# Patient Record
Sex: MALE | Race: WHITE | Employment: OTHER | URBAN - METROPOLITAN AREA
[De-identification: names, ages, dates, MRNs, and addresses within clinical notes are randomized per-mention and may not be internally consistent; named-entity substitution may affect disease eponyms.]

---

## 2017-09-25 PROBLEM — E78.2 MIXED HYPERLIPIDEMIA: Status: ACTIVE | Noted: 2017-09-25

## 2017-09-25 PROBLEM — I25.10 CORONARY ARTERY DISEASE INVOLVING NATIVE CORONARY ARTERY OF NATIVE HEART WITHOUT ANGINA PECTORIS: Status: ACTIVE | Noted: 2017-09-25

## 2018-05-04 ENCOUNTER — HOSPITAL ENCOUNTER (EMERGENCY)
Age: 63
Discharge: HOME | End: 2018-05-04
Attending: EMERGENCY MEDICINE
Payer: COMMERCIAL

## 2018-05-04 VITALS
TEMPERATURE: 97.9 F | RESPIRATION RATE: 16 BRPM | DIASTOLIC BLOOD PRESSURE: 78 MMHG | HEART RATE: 60 BPM | OXYGEN SATURATION: 95 % | WEIGHT: 205 LBS | HEIGHT: 73 IN | SYSTOLIC BLOOD PRESSURE: 118 MMHG | BODY MASS INDEX: 27.17 KG/M2

## 2018-05-04 DIAGNOSIS — L60.0 INGROWN RIGHT BIG TOENAIL: Primary | ICD-10-CM

## 2018-05-04 PROCEDURE — 99282 EMERGENCY DEPT VISIT SF MDM: CPT

## 2018-05-04 PROCEDURE — 6370000000 HC RX 637 (ALT 250 FOR IP): Performed by: EMERGENCY MEDICINE

## 2018-05-04 RX ORDER — CEPHALEXIN 500 MG/1
500 CAPSULE ORAL ONCE
Status: COMPLETED | OUTPATIENT
Start: 2018-05-04 | End: 2018-05-04

## 2018-05-04 RX ORDER — CEPHALEXIN 500 MG/1
500 CAPSULE ORAL 4 TIMES DAILY
Qty: 40 CAPSULE | Refills: 0 | Status: SHIPPED | OUTPATIENT
Start: 2018-05-04 | End: 2018-05-14

## 2018-05-04 RX ADMIN — CEPHALEXIN 500 MG: 500 CAPSULE ORAL at 10:42

## 2018-05-04 ASSESSMENT — PAIN DESCRIPTION - ORIENTATION: ORIENTATION: RIGHT

## 2018-05-04 ASSESSMENT — PAIN SCALES - GENERAL: PAINLEVEL_OUTOF10: 5

## 2018-09-04 ENCOUNTER — TELEPHONE (OUTPATIENT)
Dept: ADMINISTRATIVE | Age: 63
End: 2018-09-04

## 2018-09-04 RX ORDER — METOPROLOL SUCCINATE 25 MG/1
25 TABLET, EXTENDED RELEASE ORAL 2 TIMES DAILY
Qty: 60 TABLET | Refills: 5 | Status: SHIPPED | OUTPATIENT
Start: 2018-09-04 | End: 2019-03-27 | Stop reason: SDUPTHER

## 2018-09-04 NOTE — TELEPHONE ENCOUNTER
Spoke with patient. He stated [facility] has no refills on his bottle and he cannot transfer to [facility] like that. He retired and he has to use a local pharmacy now.

## 2018-10-08 ENCOUNTER — OFFICE VISIT (OUTPATIENT)
Dept: CARDIOLOGY CLINIC | Age: 63
End: 2018-10-08
Payer: COMMERCIAL

## 2018-10-08 VITALS
DIASTOLIC BLOOD PRESSURE: 78 MMHG | WEIGHT: 198 LBS | RESPIRATION RATE: 14 BRPM | HEART RATE: 51 BPM | BODY MASS INDEX: 26.24 KG/M2 | SYSTOLIC BLOOD PRESSURE: 122 MMHG | HEIGHT: 73 IN

## 2018-10-08 DIAGNOSIS — I25.10 CORONARY ARTERY DISEASE INVOLVING NATIVE CORONARY ARTERY OF NATIVE HEART WITHOUT ANGINA PECTORIS: Primary | ICD-10-CM

## 2018-10-08 PROCEDURE — 99213 OFFICE O/P EST LOW 20 MIN: CPT | Performed by: INTERNAL MEDICINE

## 2018-10-08 PROCEDURE — 93000 ELECTROCARDIOGRAM COMPLETE: CPT | Performed by: INTERNAL MEDICINE

## 2018-10-08 NOTE — PROGRESS NOTES
Chief Complaint   Patient presents with    Coronary Artery Disease       Patient Active Problem List    Diagnosis Date Noted    Coronary artery disease involving native coronary artery of native heart without angina pectoris 09/25/2017     Overview Note:     A. STEMI 11/2016: Alpine MARILYN 3.5 X 23 to RCA . No other disease. EF 60%      Mixed hyperlipidemia 09/25/2017       Current Outpatient Prescriptions   Medication Sig Dispense Refill    metoprolol succinate (TOPROL XL) 25 MG extended release tablet Take 1 tablet by mouth 2 times daily 60 tablet 5    atorvastatin (LIPITOR) 40 MG tablet Take 1 tablet by mouth daily (Patient taking differently: Take 20 mg by mouth daily ) 90 tablet 3    aspirin 81 MG chewable tablet Take 1 tablet by mouth daily 30 tablet 3     No current facility-administered medications for this visit. No Known Allergies    Vitals:    10/08/18 0732   BP: 122/78   Pulse: 51   Resp: 14   Weight: 198 lb (89.8 kg)   Height: 6' 1\" (1.854 m)       Social History     Social History    Marital status:      Spouse name: N/A    Number of children: N/A    Years of education: N/A     Occupational History    Not on file. Social History Main Topics    Smoking status: Current Every Day Smoker     Types: Cigarettes    Smokeless tobacco: Never Used    Alcohol use No    Drug use: No    Sexual activity: No     Other Topics Concern    Not on file     Social History Narrative    No narrative on file       History reviewed. No pertinent family history. SUBJECTIVE: Edin Rg presents to the office today for re-evaluation of cardiac diagnoses. He complains of no cardia cissues and denies   chest pain, chest pressure/discomfort, claudication, dyspnea, exertional chest pressure/discomfort, fatigue, irregular heart beat, lower extremity edema, near-syncope, orthopnea, palpitations, paroxysmal nocturnal dyspnea, syncope and tachypnea.   Works out on treadmill, does all home

## 2019-03-27 RX ORDER — METOPROLOL SUCCINATE 25 MG/1
TABLET, EXTENDED RELEASE ORAL
Qty: 60 TABLET | Refills: 5 | Status: SHIPPED | OUTPATIENT
Start: 2019-03-27 | End: 2019-09-14 | Stop reason: SDUPTHER

## 2019-09-16 RX ORDER — METOPROLOL SUCCINATE 25 MG/1
TABLET, EXTENDED RELEASE ORAL
Qty: 60 TABLET | Refills: 5 | Status: SHIPPED | OUTPATIENT
Start: 2019-09-16 | End: 2020-01-30

## 2019-10-08 ENCOUNTER — OFFICE VISIT (OUTPATIENT)
Dept: CARDIOLOGY CLINIC | Age: 64
End: 2019-10-08
Payer: COMMERCIAL

## 2019-10-08 VITALS
RESPIRATION RATE: 16 BRPM | WEIGHT: 195 LBS | DIASTOLIC BLOOD PRESSURE: 72 MMHG | HEART RATE: 53 BPM | BODY MASS INDEX: 25.03 KG/M2 | HEIGHT: 74 IN | SYSTOLIC BLOOD PRESSURE: 124 MMHG

## 2019-10-08 DIAGNOSIS — I25.10 CORONARY ARTERY DISEASE INVOLVING NATIVE CORONARY ARTERY OF NATIVE HEART WITHOUT ANGINA PECTORIS: Primary | ICD-10-CM

## 2019-10-08 PROCEDURE — 99214 OFFICE O/P EST MOD 30 MIN: CPT | Performed by: INTERNAL MEDICINE

## 2019-10-08 PROCEDURE — 93000 ELECTROCARDIOGRAM COMPLETE: CPT | Performed by: INTERNAL MEDICINE

## 2019-10-08 RX ORDER — ATORVASTATIN CALCIUM 40 MG/1
40 TABLET, FILM COATED ORAL DAILY
Qty: 90 TABLET | Refills: 5 | Status: SHIPPED
Start: 2019-10-08 | End: 2020-12-21

## 2020-01-30 RX ORDER — METOPROLOL SUCCINATE 25 MG/1
TABLET, EXTENDED RELEASE ORAL
Qty: 180 TABLET | Refills: 3 | Status: SHIPPED
Start: 2020-01-30 | End: 2021-03-01

## 2020-12-15 ENCOUNTER — OFFICE VISIT (OUTPATIENT)
Dept: CARDIOLOGY CLINIC | Age: 65
End: 2020-12-15
Payer: MEDICARE

## 2020-12-15 VITALS
HEART RATE: 57 BPM | HEIGHT: 73 IN | SYSTOLIC BLOOD PRESSURE: 126 MMHG | BODY MASS INDEX: 26.77 KG/M2 | RESPIRATION RATE: 16 BRPM | WEIGHT: 202 LBS | DIASTOLIC BLOOD PRESSURE: 78 MMHG

## 2020-12-15 PROCEDURE — 99213 OFFICE O/P EST LOW 20 MIN: CPT | Performed by: INTERNAL MEDICINE

## 2020-12-15 PROCEDURE — 1036F TOBACCO NON-USER: CPT | Performed by: INTERNAL MEDICINE

## 2020-12-15 PROCEDURE — 1123F ACP DISCUSS/DSCN MKR DOCD: CPT | Performed by: INTERNAL MEDICINE

## 2020-12-15 PROCEDURE — G8419 CALC BMI OUT NRM PARAM NOF/U: HCPCS | Performed by: INTERNAL MEDICINE

## 2020-12-15 PROCEDURE — 4040F PNEUMOC VAC/ADMIN/RCVD: CPT | Performed by: INTERNAL MEDICINE

## 2020-12-15 PROCEDURE — 3017F COLORECTAL CA SCREEN DOC REV: CPT | Performed by: INTERNAL MEDICINE

## 2020-12-15 PROCEDURE — 93000 ELECTROCARDIOGRAM COMPLETE: CPT | Performed by: INTERNAL MEDICINE

## 2020-12-15 PROCEDURE — G8427 DOCREV CUR MEDS BY ELIG CLIN: HCPCS | Performed by: INTERNAL MEDICINE

## 2020-12-15 PROCEDURE — G8484 FLU IMMUNIZE NO ADMIN: HCPCS | Performed by: INTERNAL MEDICINE

## 2020-12-15 NOTE — PROGRESS NOTES
Chief Complaint   Patient presents with    Coronary Artery Disease       Patient Active Problem List    Diagnosis Date Noted    Coronary artery disease involving native coronary artery of native heart without angina pectoris 09/25/2017     Overview Note:     A. STEMI 11/2016: Alpine MARILYN 3.5 X 23 to RCA . No other disease. EF 60%      Mixed hyperlipidemia 09/25/2017       Current Outpatient Medications   Medication Sig Dispense Refill    metoprolol succinate (TOPROL XL) 25 MG extended release tablet TAKE 1 TABLET BY MOUTH TWICE A  tablet 3    atorvastatin (LIPITOR) 40 MG tablet Take 1 tablet by mouth daily 90 tablet 5    aspirin 81 MG chewable tablet Take 1 tablet by mouth daily 30 tablet 3     No current facility-administered medications for this visit.          No Known Allergies    Vitals:    12/15/20 0740   BP: 126/78   Pulse: 57   Resp: 16   Weight: 202 lb (91.6 kg)   Height: 6' 1\" (1.854 m)       Social History     Socioeconomic History    Marital status:      Spouse name: Not on file    Number of children: Not on file    Years of education: Not on file    Highest education level: Not on file   Occupational History    Not on file   Social Needs    Financial resource strain: Not on file    Food insecurity     Worry: Not on file     Inability: Not on file    Transportation needs     Medical: Not on file     Non-medical: Not on file   Tobacco Use    Smoking status: Never Smoker    Smokeless tobacco: Never Used   Substance and Sexual Activity    Alcohol use: No    Drug use: No    Sexual activity: Never   Lifestyle    Physical activity     Days per week: Not on file     Minutes per session: Not on file    Stress: Not on file   Relationships    Social connections     Talks on phone: Not on file     Gets together: Not on file     Attends Quaker service: Not on file     Active member of club or organization: Not on file     Attends meetings of clubs or organizations: Not on file Relationship status: Not on file    Intimate partner violence     Fear of current or ex partner: Not on file     Emotionally abused: Not on file     Physically abused: Not on file     Forced sexual activity: Not on file   Other Topics Concern    Not on file   Social History Narrative    Not on file       History reviewed. No pertinent family history. SUBJECTIVE: Kalani Mccloud presents to the office today for re-evaluation of cardiac diagnoses. He complains of no cardiac issues and denies   chest pain, chest pressure/discomfort, claudication, dyspnea, exertional chest pressure/discomfort, fatigue, irregular heart beat, lower extremity edema, near-syncope, orthopnea, palpitations, paroxysmal nocturnal dyspnea, syncope and tachypnea. Works out on treadmill, does all home chores. Doing MORE than last year,as he also walks a new dog. Compliant with meds. Now has home in [place]. Has not seen PCP for some time         Review of Systems:   Heart: as above   Lungs: as above   Eyes: denies changes in vision or discharge. Ears: denies changes in hearing or pain. Nose: denies epistaxis or masses   Throat: denies sore throat or trouble swallowing. Neuro: denies numbness, tingling, tremors. Skin: denies rashes or itching. : denies hematuria, dysuria   GI: denies vomiting, diarrhea   Psych: denies mood changed, anxiety, depression. all others negative. Physical Exam   /78   Pulse 57   Resp 16   Ht 6' 1\" (1.854 m)   Wt 202 lb (91.6 kg)   BMI 26.65 kg/m²   Constitutional: Oriented to person, place, and time. Well-developed and well-nourished. No distress. Head: Normocephalic and atraumatic. Eyes: EOM are normal. Pupils are equal, round, and reactive to light. Neck: Normal range of motion. Neck supple. No hepatojugular reflux and no JVD present. Carotid bruit is not present. No tracheal deviation present. No thyromegaly present. Cardiovascular: Normal rate, regular rhythm, normal heart sounds and intact distal pulses. Exam reveals no gallop and no friction rub. No murmur heard. Pulmonary/Chest: Effort normal and breath sounds normal. No respiratory distress. No wheezes. No rales. No tenderness. Abdominal: Soft. Bowel sounds are normal. No distension and no mass. No tenderness. No rebound and no guarding. Musculoskeletal: Normal range of motion. No edema and no tenderness. Lymphadenopathy:   No cervical adenopathy. No groin adenopathy. Neurological: Alert and oriented to person, place, and time. Skin: Skin is warm and dry. No rash noted. Not diaphoretic. No erythema. Psychiatric: Normal mood and affect. Behavior is normal.     EKG:  normal EKG, normal sinus rhythm. ASSESSMENT AND PLAN:  Patient Active Problem List   Diagnosis    Coronary artery disease : PCI 11/2016. No recurrent angina. Continue asa forever    Mixed hyperlipidemia: target LDL < 70. On high intensity statin          The patient was educated as to the symptoms that would require a prompt return to our office. Return visit in 1 year.     Zarina Ruth M.D  [facility]

## 2020-12-21 RX ORDER — ATORVASTATIN CALCIUM 40 MG/1
TABLET, FILM COATED ORAL
Qty: 90 TABLET | Refills: 3 | Status: SHIPPED | OUTPATIENT
Start: 2020-12-21

## 2021-03-01 RX ORDER — METOPROLOL SUCCINATE 25 MG/1
TABLET, EXTENDED RELEASE ORAL
Qty: 180 TABLET | Refills: 3 | Status: SHIPPED
Start: 2021-03-01 | End: 2022-03-14

## 2021-09-13 ENCOUNTER — HOSPITAL ENCOUNTER (EMERGENCY)
Age: 66
Discharge: HOME | End: 2021-09-13
Payer: MEDICARE

## 2021-09-13 VITALS
WEIGHT: 200 LBS | RESPIRATION RATE: 20 BRPM | OXYGEN SATURATION: 97 % | SYSTOLIC BLOOD PRESSURE: 149 MMHG | HEIGHT: 74 IN | HEART RATE: 73 BPM | TEMPERATURE: 98.7 F | DIASTOLIC BLOOD PRESSURE: 88 MMHG | BODY MASS INDEX: 25.67 KG/M2

## 2021-09-13 DIAGNOSIS — J01.11 ACUTE RECURRENT FRONTAL SINUSITIS: Primary | ICD-10-CM

## 2021-09-13 PROCEDURE — 99283 EMERGENCY DEPT VISIT LOW MDM: CPT

## 2021-09-13 RX ORDER — AMOXICILLIN AND CLAVULANATE POTASSIUM 875; 125 MG/1; MG/1
1 TABLET, FILM COATED ORAL 2 TIMES DAILY
Qty: 20 TABLET | Refills: 0 | Status: SHIPPED | OUTPATIENT
Start: 2021-09-13 | End: 2021-09-23

## 2021-09-13 NOTE — ED PROVIDER NOTES
Independent:    HPI:  9/13/21, Time: 12:34 PM EDT         Gabby La is a 77 y.o. male presenting to the ED for evaluation of worsening nasal congestion with mild cough and facial pressure onset this week. The patient reports he has issues with her mental allergies at this time of year and states that he does normally use saline flushes. He has been trying this as well as some Advil Cold and Sinus without relief. He reports that he did try to take some leftover amoxicillin for a few days without relief. He states that the pressure is increasing to his frontal and maxillary sinus area and he can no longer concentrate on some work that he had to do at home. He states that he has had no fever but the congestion in his nose is getting to the point to where he is so uncomfortable. He reports that he has had no chest pain or shortness of breath. He does not like to use other prescription nasal sprays as he knows that there can be some issues with addictive potential.  He has been able to sleep but has been uncomfortable because he has to breathe out of his mouth because he cannot breathe through his nose. He denies any loss of taste. He has had no diarrhea or severe body aches or chills. He did have his COVID vaccine in June, he denies any recent travel and does wear his mask when out. He also uses a respirator when he cuts grass, etc..  The complaint has been persistent, moderate in severity. Review of Systems:   A complete review of systems was performed and pertinent positives and negatives are stated within HPI, all other systems reviewed and are negative.        --------------------------------------------- PAST HISTORY ---------------------------------------------  Past Medical History:  has no past medical history on file. Past Surgical History:  has a past surgical history that includes Coronary angioplasty with stent (11/05/2016) and Appendectomy.     Social History:  reports that he has never smoked. He has never used smokeless tobacco. He reports that he does not drink alcohol and does not use drugs. Family History: family history is not on file. The patients home medications have been reviewed. Allergies: Patient has no known allergies. -------------------------------------------------- RESULTS -------------------------------------------------  All laboratory and radiology results have been personally reviewed by myself   LABS:  No results found for this visit on 09/13/21. RADIOLOGY:  Interpreted by Radiologist.  No orders to display       ------------------------- NURSING NOTES AND VITALS REVIEWED ---------------------------   The nursing notes within the ED encounter and vital signs as below have been reviewed. BP (!) 149/88   Pulse 73   Temp 98.7 °F (37.1 °C) (Oral)   Resp 20   Ht 6' 2\" (1.88 m)   Wt 200 lb (90.7 kg)   SpO2 97%   BMI 25.68 kg/m²   Oxygen Saturation Interpretation: Normal      ---------------------------------------------------PHYSICAL EXAM--------------------------------------      Constitutional/General: Alert and oriented x3, stable appearing, non toxic in NAD  Head: Normocephalic and atraumatic  Eyes: PERRL, EOMI  Ears: TM's dull, some scant cerumen noted bilaterally  Nose: Turbinates swollen with some thick boggy mucosa, some frontal and maxillary sinus tenderness bilaterally  Mouth: Oropharynx with some mild erythema, no uvular edema, handling secretions, no trismus  Neck: Supple, full ROM, some tender anterior cervical nodes, no rigidity and no stridor. Pulmonary: Lungs clear to auscultation bilaterally, no wheezes, rales, or rhonchi. Not in respiratory distress  Cardiovascular:  Regular rate and rhythm, no ectopy. 2+ distal pulses  Abdomen: Soft, non tender, BS active. Extremities: Moves all extremities x 4. No pitting edema. Warm and well perfused  Skin: warm and dry without rash  Neurologic: GCS 15, CN 2 through 12 grossly intact.   Psych: Normal Affect      ------------------------------ ED COURSE/MEDICAL DECISION MAKING----------------------  Medications - No data to display      ED COURSE:       Medical Decision Making:    Patient to ER, complaints of nasal congestion and facial pressure onset worsening over the last few days, not relieved with OTC medications    Patient advised of exam findings and have to still have concerns even with history of having COVID vaccine. Patient declined COVID testing today. Will place patient on Augmentin BID for 10 days, but patient advised if symptoms not improving and/or worsening, he will need to consider COVID testing as this strongly needs to be considered. Discussed need to consider use of Allegra and/or Zyrtec. Continue saline flushes and social distancing advised as well as good hand washing and use of mask. Counseling: The emergency provider has spoken with the patient and discussed todays results, in addition to providing specific details for the plan of care and counseling regarding the diagnosis and prognosis. Questions are answered at this time and they are agreeable with the plan.      --------------------------------- IMPRESSION AND DISPOSITION ---------------------------------    IMPRESSION  1. Acute recurrent frontal sinusitis        DISPOSITION  Disposition: Discharge to home  Patient condition is stable      NOTE: This report was transcribed using voice recognition software.  Every effort was made to ensure accuracy; however, inadvertent computerized transcription errors may be present       Marisol Otero PA-C  09/13/21 [id number]

## 2022-03-14 RX ORDER — METOPROLOL SUCCINATE 25 MG/1
TABLET, EXTENDED RELEASE ORAL
Qty: 60 TABLET | Refills: 0 | Status: SHIPPED
Start: 2022-03-14 | End: 2022-03-28 | Stop reason: SDUPTHER

## 2022-03-28 RX ORDER — METOPROLOL SUCCINATE 25 MG/1
TABLET, EXTENDED RELEASE ORAL
Qty: 60 TABLET | Refills: 0 | Status: SHIPPED
Start: 2022-03-28 | End: 2022-07-05

## 2022-06-20 ENCOUNTER — OFFICE VISIT (OUTPATIENT)
Dept: CARDIOLOGY CLINIC | Age: 67
End: 2022-06-20
Payer: MEDICARE

## 2022-06-20 VITALS
BODY MASS INDEX: 25.71 KG/M2 | SYSTOLIC BLOOD PRESSURE: 147 MMHG | RESPIRATION RATE: 16 BRPM | DIASTOLIC BLOOD PRESSURE: 87 MMHG | HEIGHT: 73 IN | HEART RATE: 53 BPM | WEIGHT: 194 LBS

## 2022-06-20 DIAGNOSIS — I25.10 CORONARY ARTERY DISEASE INVOLVING NATIVE CORONARY ARTERY OF NATIVE HEART WITHOUT ANGINA PECTORIS: Primary | ICD-10-CM

## 2022-06-20 PROCEDURE — G8417 CALC BMI ABV UP PARAM F/U: HCPCS | Performed by: INTERNAL MEDICINE

## 2022-06-20 PROCEDURE — 99213 OFFICE O/P EST LOW 20 MIN: CPT | Performed by: INTERNAL MEDICINE

## 2022-06-20 PROCEDURE — 3017F COLORECTAL CA SCREEN DOC REV: CPT | Performed by: INTERNAL MEDICINE

## 2022-06-20 PROCEDURE — 1123F ACP DISCUSS/DSCN MKR DOCD: CPT | Performed by: INTERNAL MEDICINE

## 2022-06-20 PROCEDURE — 93000 ELECTROCARDIOGRAM COMPLETE: CPT | Performed by: INTERNAL MEDICINE

## 2022-06-20 PROCEDURE — 1036F TOBACCO NON-USER: CPT | Performed by: INTERNAL MEDICINE

## 2022-06-20 PROCEDURE — G8427 DOCREV CUR MEDS BY ELIG CLIN: HCPCS | Performed by: INTERNAL MEDICINE

## 2022-06-20 RX ORDER — ASPIRIN 81 MG/1
81 TABLET ORAL DAILY
COMMUNITY

## 2022-06-20 NOTE — PROGRESS NOTES
Chief Complaint   Patient presents with    Coronary Artery Disease       Patient Active Problem List    Diagnosis Date Noted    Coronary artery disease involving native coronary artery of native heart without angina pectoris 09/25/2017     Overview Note:     A. STEMI 11/2016: Alpine MARILYN 3.5 X 23 to RCA . No other disease. EF 60%      Mixed hyperlipidemia 09/25/2017       Current Outpatient Medications   Medication Sig Dispense Refill    aspirin 81 MG EC tablet Take 81 mg by mouth daily      metoprolol succinate (TOPROL XL) 25 MG extended release tablet TAKE 1 TABLET BY MOUTH TWICE A DAY 60 tablet 0    atorvastatin (LIPITOR) 40 MG tablet TAKE 1 TABLET BY MOUTH EVERY DAY 90 tablet 3     No current facility-administered medications for this visit. No Known Allergies    Vitals:    06/20/22 0959   BP: (!) 147/87   Pulse: 53   Resp: 16   Weight: 194 lb (88 kg)   Height: 6' 1\" (1.854 m)       Social History     Socioeconomic History    Marital status:      Spouse name: Not on file    Number of children: Not on file    Years of education: Not on file    Highest education level: Not on file   Occupational History    Not on file   Tobacco Use    Smoking status: Never Smoker    Smokeless tobacco: Never Used   Vaping Use    Vaping Use: Never used   Substance and Sexual Activity    Alcohol use: No    Drug use: No    Sexual activity: Never   Other Topics Concern    Not on file   Social History Narrative    Not on file     Social Determinants of Health     Financial Resource Strain:     Difficulty of Paying Living Expenses: Not on file   Food Insecurity:     Worried About Running Out of Food in the Last Year: Not on file    Adolfo of Food in the Last Year: Not on file   Transportation Needs:     Lack of Transportation (Medical): Not on file    Lack of Transportation (Non-Medical):  Not on file   Physical Activity:     Days of Exercise per Week: Not on file    Minutes of Exercise per Session: Not on file   Stress:     Feeling of Stress : Not on file   Social Connections:     Frequency of Communication with Friends and Family: Not on file    Frequency of Social Gatherings with Friends and Family: Not on file    Attends Bahai Services: Not on file    Active Member of Clubs or Organizations: Not on file    Attends Club or Organization Meetings: Not on file    Marital Status: Not on file   Intimate Partner Violence:     Fear of Current or Ex-Partner: Not on file    Emotionally Abused: Not on file    Physically Abused: Not on file    Sexually Abused: Not on file   Housing Stability:     Unable to Pay for Housing in the Last Year: Not on file    Number of [place] in the Last Year: Not on file    Unstable Housing in the Last Year: Not on file       History reviewed. No pertinent family history. SUBJECTIVE: Savi Gonzales presents to the office today for re-evaluation of cardiac diagnoses. He complains of no cardiac issues and denies   chest pain, chest pressure/discomfort, claudication, dyspnea, exertional chest pressure/discomfort, fatigue, irregular heart beat, lower extremity edema, near-syncope, orthopnea, palpitations, paroxysmal nocturnal dyspnea, syncope and tachypnea. Works out on treadmill, does all home chores. Doing MORE than last year,as he also walks a new dog. Compliant with meds. Now has home in [place], where he does a great deal of walking. Has lost 8 lbs. Review of Systems:   Heart: as above   Lungs: as above   Eyes: denies changes in vision or discharge. Ears: denies changes in hearing or pain. Nose: denies epistaxis or masses   Throat: denies sore throat or trouble swallowing. Neuro: denies numbness, tingling, tremors. Skin: denies rashes or itching. : denies hematuria, dysuria   GI: denies vomiting, diarrhea   Psych: denies mood changed, anxiety, depression. all others negative.     Physical Exam   BP (!) 147/87   Pulse 53   Resp 16 Ht 6' 1\" (1.854 m)   Wt 194 lb (88 kg)   BMI 25.60 kg/m²   Constitutional: Oriented to person, place, and time. Well-developed and well-nourished. No distress. Head: Normocephalic and atraumatic. Eyes: EOM are normal. Pupils are equal, round, and reactive to light. Neck: Normal range of motion. Neck supple. No hepatojugular reflux and no JVD present. Carotid bruit is not present. No tracheal deviation present. No thyromegaly present. Cardiovascular: Normal rate, regular rhythm, normal heart sounds and intact distal pulses. Exam reveals no gallop and no friction rub. No murmur heard. Pulmonary/Chest: Effort normal and breath sounds normal. No respiratory distress. No wheezes. No rales. No tenderness. Abdominal: Soft. Bowel sounds are normal. No distension and no mass. No tenderness. No rebound and no guarding. Musculoskeletal: Normal range of motion. No edema and no tenderness. Lymphadenopathy:   No cervical adenopathy. No groin adenopathy. Neurological: Alert and oriented to person, place, and time. Skin: Skin is warm and dry. No rash noted. Not diaphoretic. No erythema. Psychiatric: Normal mood and affect. Behavior is normal.     EKG:  normal EKG, normal sinus rhythm. ASSESSMENT AND PLAN:  Patient Active Problem List   Diagnosis    Coronary artery disease : PCI 11/2016. No recurrent angina. Continue asa forever    Mixed hyperlipidemia: target LDL < 70. On high intensity statin          The patient was educated as to the symptoms that would require a prompt return to our office. Return visit in 1 year.     Shala Orona M.D  [facility]

## 2022-07-05 RX ORDER — METOPROLOL SUCCINATE 25 MG/1
TABLET, EXTENDED RELEASE ORAL
Qty: 60 TABLET | Refills: 0 | Status: SHIPPED
Start: 2022-07-05 | End: 2022-08-01

## 2022-08-01 RX ORDER — METOPROLOL SUCCINATE 25 MG/1
TABLET, EXTENDED RELEASE ORAL
Qty: 60 TABLET | Refills: 5 | Status: SHIPPED | OUTPATIENT
Start: 2022-08-01

## 2023-02-01 RX ORDER — METOPROLOL SUCCINATE 25 MG/1
TABLET, EXTENDED RELEASE ORAL
Qty: 180 TABLET | Refills: 1 | Status: SHIPPED | OUTPATIENT
Start: 2023-02-01

## 2023-05-22 ENCOUNTER — HOSPITAL ENCOUNTER (EMERGENCY)
Age: 68
Discharge: HOME | End: 2023-05-22
Payer: MEDICARE

## 2023-05-22 VITALS
OXYGEN SATURATION: 97 % | HEART RATE: 58 BPM | WEIGHT: 194 LBS | BODY MASS INDEX: 25.6 KG/M2 | RESPIRATION RATE: 16 BRPM | TEMPERATURE: 97.6 F | DIASTOLIC BLOOD PRESSURE: 73 MMHG | SYSTOLIC BLOOD PRESSURE: 136 MMHG

## 2023-05-22 DIAGNOSIS — J06.9 UPPER RESPIRATORY TRACT INFECTION, UNSPECIFIED TYPE: Primary | ICD-10-CM

## 2023-05-22 DIAGNOSIS — J01.00 ACUTE MAXILLARY SINUSITIS, RECURRENCE NOT SPECIFIED: ICD-10-CM

## 2023-05-22 DIAGNOSIS — H10.31 ACUTE CONJUNCTIVITIS OF RIGHT EYE, UNSPECIFIED ACUTE CONJUNCTIVITIS TYPE: ICD-10-CM

## 2023-05-22 PROCEDURE — 99283 EMERGENCY DEPT VISIT LOW MDM: CPT

## 2023-05-22 RX ORDER — AMOXICILLIN AND CLAVULANATE POTASSIUM 875; 125 MG/1; MG/1
1 TABLET, FILM COATED ORAL 2 TIMES DAILY
Qty: 20 TABLET | Refills: 0 | Status: SHIPPED | OUTPATIENT
Start: 2023-05-22 | End: 2023-06-01

## 2023-05-22 RX ORDER — TOBRAMYCIN 3 MG/ML
1 SOLUTION/ DROPS OPHTHALMIC EVERY 4 HOURS
Qty: 5 ML | Refills: 0 | Status: SHIPPED | OUTPATIENT
Start: 2023-05-22 | End: 2023-06-01

## 2023-05-22 ASSESSMENT — PAIN SCALES - GENERAL: PAINLEVEL_OUTOF10: 6

## 2023-05-22 ASSESSMENT — PAIN DESCRIPTION - LOCATION: LOCATION: FACE;EYE

## 2023-05-22 ASSESSMENT — PAIN - FUNCTIONAL ASSESSMENT: PAIN_FUNCTIONAL_ASSESSMENT: 0-10

## 2023-05-22 ASSESSMENT — PAIN DESCRIPTION - ONSET: ONSET: ON-GOING

## 2023-05-22 ASSESSMENT — PAIN DESCRIPTION - FREQUENCY: FREQUENCY: CONTINUOUS

## 2023-05-22 ASSESSMENT — PAIN DESCRIPTION - PAIN TYPE: TYPE: ACUTE PAIN

## 2023-05-22 ASSESSMENT — PAIN DESCRIPTION - DESCRIPTORS: DESCRIPTORS: DISCOMFORT;SORE

## 2023-05-22 ASSESSMENT — PAIN DESCRIPTION - ORIENTATION: ORIENTATION: RIGHT

## 2023-05-22 ASSESSMENT — LIFESTYLE VARIABLES
HOW OFTEN DO YOU HAVE A DRINK CONTAINING ALCOHOL: NEVER
HOW MANY STANDARD DRINKS CONTAINING ALCOHOL DO YOU HAVE ON A TYPICAL DAY: PATIENT DOES NOT DRINK

## 2023-05-22 NOTE — ED PROVIDER NOTES
Independent ZHANNA Visit. [facility]  ED  Encounter Note  Admit Date/RoomTime: 2023  2:02 PM  ED Room: [facility]  NAME: Hong Scherer  : 1955  MRN: [id number]  PCP: Iliana Melendrez III, DO    CHIEF COMPLAINT     URI (5 days sinus issues) and Eye Pain (Right eye redness, discharge, itchy)    HISTORY OF PRESENT ILLNESS        Hong Scherer is a 76 y.o. male who presents to the ED by private vehicle for sinus congestion, PND and right eye redness/drainage, beginning 1 week(s) ago. The complaint has been persistent and are moderate in severity. States that his symptoms began after returning home from [place] a week ago. He states that initially he had he mowed the grass a couple days in a row and then started noticing some irritation in his sinuses, eyes and increased congestion. The patient states he has been using over-the-counter meds without relief. His eye got worse yesterday with increased redness and drainage on the right side. The eye was crusted shut this morning. Patient denies any ear pain. He has minimal sore throat related to some scratching and postnasal drip. Denies cough shortness of breath or chest pain. Patient does not wear contacts. He denies any injury or trauma to the eyes. REVIEW OF SYSTEMS     Pertinent positives and negatives are stated within HPI, all other systems reviewed and are negative. Past Medical History:  has no past medical history on file. Surgical History:  has a past surgical history that includes Coronary angioplasty with stent (2016) and Appendectomy. Social History:  reports that he has never smoked. He has never used smokeless tobacco. He reports that he does not drink alcohol and does not use drugs. Family History: family history is not on file. Allergies: Patient has no known allergies.   CURRENT MEDICATIONS       Previous Medications    ASPIRIN 81 MG EC TABLET    Take 1

## 2023-06-20 ENCOUNTER — OFFICE VISIT (OUTPATIENT)
Dept: CARDIOLOGY CLINIC | Age: 68
End: 2023-06-20
Payer: MEDICARE

## 2023-06-20 VITALS
HEIGHT: 73 IN | WEIGHT: 203 LBS | DIASTOLIC BLOOD PRESSURE: 74 MMHG | BODY MASS INDEX: 26.9 KG/M2 | SYSTOLIC BLOOD PRESSURE: 136 MMHG | RESPIRATION RATE: 12 BRPM | HEART RATE: 49 BPM

## 2023-06-20 DIAGNOSIS — I25.10 CORONARY ARTERY DISEASE INVOLVING NATIVE CORONARY ARTERY OF NATIVE HEART WITHOUT ANGINA PECTORIS: Primary | ICD-10-CM

## 2023-06-20 PROCEDURE — G8417 CALC BMI ABV UP PARAM F/U: HCPCS | Performed by: INTERNAL MEDICINE

## 2023-06-20 PROCEDURE — 1123F ACP DISCUSS/DSCN MKR DOCD: CPT | Performed by: INTERNAL MEDICINE

## 2023-06-20 PROCEDURE — 1036F TOBACCO NON-USER: CPT | Performed by: INTERNAL MEDICINE

## 2023-06-20 PROCEDURE — 3017F COLORECTAL CA SCREEN DOC REV: CPT | Performed by: INTERNAL MEDICINE

## 2023-06-20 PROCEDURE — 99213 OFFICE O/P EST LOW 20 MIN: CPT | Performed by: INTERNAL MEDICINE

## 2023-06-20 PROCEDURE — 93000 ELECTROCARDIOGRAM COMPLETE: CPT | Performed by: INTERNAL MEDICINE

## 2023-06-20 PROCEDURE — G8427 DOCREV CUR MEDS BY ELIG CLIN: HCPCS | Performed by: INTERNAL MEDICINE

## 2023-06-20 NOTE — PROGRESS NOTES
Chief Complaint   Patient presents with    Coronary Artery Disease       Patient Active Problem List    Diagnosis Date Noted    Coronary artery disease involving native coronary artery of native heart without angina pectoris 09/25/2017     Overview Note:     A. STEMI 11/2016: Alpine MARILYN 3.5 X 23 to RCA . EF 60%        Mixed hyperlipidemia 09/25/2017       Current Outpatient Medications   Medication Sig Dispense Refill    metoprolol succinate (TOPROL XL) 25 MG extended release tablet TAKE 1 TABLET BY MOUTH TWICE A  tablet 1    aspirin 81 MG EC tablet Take 1 tablet by mouth daily      atorvastatin (LIPITOR) 40 MG tablet TAKE 1 TABLET BY MOUTH EVERY DAY 90 tablet 3     No current facility-administered medications for this visit. No Known Allergies    Vitals:    06/20/23 0733   BP: 136/74   Pulse: (!) 49   Resp: 12   Weight: 203 lb (92.1 kg)   Height: 6' 1\" (1.854 m)           SUBJECTIVE: Kaz Beck presents to the office today for re-evaluation of cardiac diagnoses. He complains of no cardiac issues and denies   chest pain, chest pressure/discomfort, claudication, dyspnea, exertional chest pressure/discomfort, fatigue, irregular heart beat, lower extremity edema, near-syncope, orthopnea, palpitations, paroxysmal nocturnal dyspnea, syncope and tachypnea. Works out on treadmill, does all home chores. Compliant with meds. Now has home in [place], where he does a great deal of walking. Physical Exam   /74   Pulse (!) 49   Resp 12   Ht 6' 1\" (1.854 m)   Wt 203 lb (92.1 kg)   BMI 26.78 kg/m²   Constitutional: Oriented to person, place, and time. Well-developed and well-nourished. No distress. Neck: no JVD present. Carotid bruit is not present. Cardiovascular: Normal rate, regular rhythm, normal heart sounds and intact distal pulses. Exam reveals no gallop and no friction rub. No murmur heard.   Pulmonary/Chest: Effort normal and breath sounds normal. No respiratory

## 2023-08-14 RX ORDER — METOPROLOL SUCCINATE 25 MG/1
TABLET, EXTENDED RELEASE ORAL
Qty: 180 TABLET | Refills: 1 | Status: SHIPPED | OUTPATIENT
Start: 2023-08-14

## 2024-02-26 RX ORDER — METOPROLOL SUCCINATE 25 MG/1
TABLET, EXTENDED RELEASE ORAL
Qty: 180 TABLET | Refills: 1 | Status: SHIPPED | OUTPATIENT
Start: 2024-02-26

## 2024-06-20 ENCOUNTER — OFFICE VISIT (OUTPATIENT)
Dept: CARDIOLOGY CLINIC | Age: 69
End: 2024-06-20
Payer: MEDICARE

## 2024-06-20 VITALS
DIASTOLIC BLOOD PRESSURE: 80 MMHG | HEART RATE: 85 BPM | HEIGHT: 73 IN | WEIGHT: 204.8 LBS | SYSTOLIC BLOOD PRESSURE: 140 MMHG | RESPIRATION RATE: 18 BRPM | BODY MASS INDEX: 27.14 KG/M2

## 2024-06-20 DIAGNOSIS — I25.10 CORONARY ARTERY DISEASE INVOLVING NATIVE CORONARY ARTERY OF NATIVE HEART WITHOUT ANGINA PECTORIS: Primary | ICD-10-CM

## 2024-06-20 PROCEDURE — 1123F ACP DISCUSS/DSCN MKR DOCD: CPT | Performed by: INTERNAL MEDICINE

## 2024-06-20 PROCEDURE — G8417 CALC BMI ABV UP PARAM F/U: HCPCS | Performed by: INTERNAL MEDICINE

## 2024-06-20 PROCEDURE — 93000 ELECTROCARDIOGRAM COMPLETE: CPT | Performed by: INTERNAL MEDICINE

## 2024-06-20 PROCEDURE — 3017F COLORECTAL CA SCREEN DOC REV: CPT | Performed by: INTERNAL MEDICINE

## 2024-06-20 PROCEDURE — 1036F TOBACCO NON-USER: CPT | Performed by: INTERNAL MEDICINE

## 2024-06-20 PROCEDURE — 99213 OFFICE O/P EST LOW 20 MIN: CPT | Performed by: INTERNAL MEDICINE

## 2024-06-20 PROCEDURE — G8427 DOCREV CUR MEDS BY ELIG CLIN: HCPCS | Performed by: INTERNAL MEDICINE

## 2024-06-20 NOTE — PROGRESS NOTES
pulses.  Exam reveals no gallop and no friction rub.  No murmur heard.  Pulmonary/Chest: Effort normal and breath sounds normal. No respiratory distress. No wheezes. No rales.   Abdominal: Soft. Bowel sounds are normal.   Musculoskeletal: No edema   Neurological: Alert and oriented to person, place, and time.   Skin: Skin is warm and dry.   Psychiatric: Normal mood and affect. Behavior is normal.     EKG:  normal EKG, normal sinus rhythm.    ASSESSMENT AND PLAN:  Patient Active Problem List   Diagnosis    Coronary artery disease : PCI 11/2016. No recurrent angina.  Continue asa forever    Mixed hyperlipidemia: target LDL < 55.     On moderate intensity statin per PCP           Kip Chu M.D  [facility]

## 2025-01-14 RX ORDER — METOPROLOL SUCCINATE 25 MG/1
TABLET, EXTENDED RELEASE ORAL
Qty: 180 TABLET | Refills: 1 | Status: SHIPPED | OUTPATIENT
Start: 2025-01-14

## 2025-07-22 RX ORDER — METOPROLOL SUCCINATE 25 MG/1
25 TABLET, EXTENDED RELEASE ORAL 2 TIMES DAILY
Qty: 180 TABLET | Refills: 1 | Status: SHIPPED | OUTPATIENT
Start: 2025-07-22